# Patient Record
Sex: FEMALE | Race: WHITE | URBAN - METROPOLITAN AREA
[De-identification: names, ages, dates, MRNs, and addresses within clinical notes are randomized per-mention and may not be internally consistent; named-entity substitution may affect disease eponyms.]

---

## 2017-04-24 ENCOUNTER — TRANSFERRED RECORDS (OUTPATIENT)
Dept: HEALTH INFORMATION MANAGEMENT | Facility: CLINIC | Age: 22
End: 2017-04-24

## 2017-05-08 DIAGNOSIS — H47.10 PAPILLEDEMA: ICD-10-CM

## 2017-05-08 DIAGNOSIS — H53.10 SUBJECTIVE VISUAL DISTURBANCE: ICD-10-CM

## 2017-05-09 ENCOUNTER — TELEPHONE (OUTPATIENT)
Dept: OPHTHALMOLOGY | Facility: CLINIC | Age: 22
End: 2017-05-09

## 2017-05-09 NOTE — TELEPHONE ENCOUNTER
[facility]  Eye Care: [phone]  Stated they had no record of this patient; Was told there were two other clinics pt might have been seen at:    2nd clinic:  [phone]  Stated they had no record of this patient    3rd clinic ([facility])  [phone]  Stated they had no record of this patient

## 2017-05-19 ENCOUNTER — TELEPHONE (OUTPATIENT)
Dept: OPHTHALMOLOGY | Facility: CLINIC | Age: 22
End: 2017-05-19

## 2017-05-24 ENCOUNTER — OFFICE VISIT (OUTPATIENT)
Dept: OPHTHALMOLOGY | Facility: CLINIC | Age: 22
End: 2017-05-24
Attending: OPHTHALMOLOGY
Payer: COMMERCIAL

## 2017-05-24 DIAGNOSIS — H47.10 PAPILLEDEMA: Primary | ICD-10-CM

## 2017-05-24 DIAGNOSIS — H53.10 SUBJECTIVE VISUAL DISTURBANCE: ICD-10-CM

## 2017-05-24 PROCEDURE — 99215 OFFICE O/P EST HI 40 MIN: CPT | Mod: ZF

## 2017-05-24 PROCEDURE — 92083 EXTENDED VISUAL FIELD XM: CPT | Mod: ZF | Performed by: OPHTHALMOLOGY

## 2017-05-24 RX ORDER — FERROUS SULFATE 325(65) MG
325 TABLET, DELAYED RELEASE (ENTERIC COATED) ORAL
COMMUNITY
Start: 2016-11-11

## 2017-05-24 ASSESSMENT — VISUAL ACUITY
METHOD: SNELLEN - LINEAR
OD_CC: 20/20
CORRECTION_TYPE: GLASSES
OS_CC: 20/20

## 2017-05-24 ASSESSMENT — REFRACTION_WEARINGRX
OS_CYLINDER: +0.50
OS_AXIS: 101
OS_SPHERE: -2.50
OD_SPHERE: -2.00
OD_CYLINDER: SPHERE

## 2017-05-24 ASSESSMENT — CONF VISUAL FIELD
OD_NORMAL: 1
OS_NORMAL: 1

## 2017-05-24 ASSESSMENT — TONOMETRY
OD_IOP_MMHG: 8
OS_IOP_MMHG: 12
IOP_METHOD: TONOPEN

## 2017-05-24 ASSESSMENT — EXTERNAL EXAM - RIGHT EYE: OD_EXAM: NORMAL

## 2017-05-24 ASSESSMENT — EXTERNAL EXAM - LEFT EYE: OS_EXAM: NORMAL

## 2017-05-24 ASSESSMENT — SLIT LAMP EXAM - LIDS
COMMENTS: NORMAL
COMMENTS: NORMAL

## 2017-05-24 NOTE — LETTER
May 30, 2017    RE: Enid Peña  : 1995  MRN: [id number]    Dear Dr. Scott,    Thank you for referring your patient, Enid Peña, to my neuro-ophthalmology clinic recently.  After a thorough neuro-ophthalmic history and examination, I came to the following conclusions:       1. Bilateral papilledema with negative neuro-imaging (by report):    Enid Peña is 21 year old female referred from Dr Ludivina KURTZ in [place], [place], for incidentally noted papilledema at an annual visit one month ago. She has been symptomatic aside from mild headaches the past 2 months. She has not had vision changes, and she had a normal MRI by radiology report on . Her past medical history is notable for chronic iron-deficiency anemia, as well as a recent miscarriage on  with significant blood loss. She had previously gained ~25 pounds over the past year, but has been dieting the past month with ~15 pounds of weight loss over that time frame.    In clinic today, she has 20/20 acuity in both eyes, full color plates, and mild bilateral optic nerve swelling on dilated fundus exam. Her visual fields showed an enlarged blind spot in the right eye and mild nasal deficits in the left eye indicating that her vision is only mildly affected by her papilledema at this time.    In this setting by far the most likely diagnosis is pseudutumor cerebri given the patient's age, gender, body habitus, weight gain preceding symptoms, and lack of other neurological symptoms. I did recommend that she undergo a lumbar puncture in order to obtain an opening pressure and test cerebrospinal fluid constituents to rule-out an unlikely mimicker of pseudotumor cerebri. We discussed that, if she has pseudotumor as I suspect, her headaches and optic nerve swelling would either dramatically improve or resolve with 10-15% weight loss from her current weight. She and her  have a plan in place to achieve this.  I did not recommend treatment of her elevated pressure at this time with Diamox given her lack of significant symptoms, relatively mild papilledema, and minimal vision loss.    I will attempt to track down her MRI images to review.      Again, thank you for trusting me with the care of your patient.  For further exam details, please feel free to contact our office for additional records.  If you wish to contact me regarding this patient please email me at [email or web address] or give my clinic a call to arrange a phone conversation.    Sincerely,    Lev Nolasco MD  , Neuro-Ophthalmology and Adult Strabismus  Department of Ophthalmology and Visual Neurosciences  [facility]    DX: papilledema

## 2017-05-24 NOTE — NURSING NOTE
Chief Complaints and History of Present Illnesses   Patient presents with     Neurologic Problem     optic disc edema     HPI    Symptoms:           Do you have eye pain now?:  No      Comments:  Enid is a 21 year old female presenting with a history of:    1. Optic disc edema  Diagnosed 1 month ago at [facility] (Dr. Cole) and Dr. Ray  - no vision concerns  - no headaches  - no tinnitus  - no medications started.   - MRI done at [facility] within the past month. unremakable per patient account.   - blood work normal  Carmina GALVEZ 10:00 AM May 24, 2017

## 2017-05-24 NOTE — MR AVS SNAPSHOT
After Visit Summary   5/24/2017    Enid Peña    MRN: [id number]           Patient Information     Date Of Birth          1995        Visit Information        Provider Department      5/24/2017 10:00 AM Lev Nolasco MD [facility]        Today's Diagnoses     Papilledema    -  1    Subjective visual disturbance           Follow-ups after your visit        Your next 10 appointments already scheduled     May 31, 2017  2:15 PM CDT   XR LUMBAR PUNCTURE SPINAL TAP DIAGNOSTIC with [id number]   [facility] ([facility])    [address]  [facility] [address] [phone]           Follow your care team s guidelines for eating and drinking.  You may need to stop taking certain medicines before this exam. If so, we will tell you in advance.  Tell your care team:   If you have any allergies.   If there s a chance you may be pregnant.            Aug 18, 2017  9:30 AM CDT   RETURN NEURO with Lev Nolasco MD   Eye Clinic ([facility])    [facility] [address] [facility] [address] [place] [id number] [phone]              Future tests that were ordered for you today     Open Future Orders        Priority Expected Expires Ordered    Protein total CSF: Tube 4 Routine  8/22/2017 5/24/2017    XR Lumbar Puncture Spinal Tap Diag Routine 5/24/2017 5/24/2018 5/24/2017    Cell count with differential fluid Routine  8/22/2017 5/24/2017    Glucose CSF: Tube 2 Routine  8/22/2017 5/24/2017    CBC with platelets Routine  8/22/2017 5/24/2017            Who to contact     Please call your clinic at [phone] to:    Ask questions about your health    Make or cancel appointments    Discuss your medicines    Learn about your test results    Speak to your doctor   If you have compliments or concerns about an experience at your clinic, or if you wish to file a complaint, please contact  [facility] Patient Relations at [phone] or email us at [email or web address]         Additional Information About Your Visit        MyChart Information     [employer] is an electronic gateway that provides easy, online access to your medical records. With [employer], you can request a clinic appointment, read your test results, renew a prescription or communicate with your care team.     To sign up for [employer] visit the website at [email or web address]   You will be asked to enter the access code listed below, as well as some personal information. Please follow the directions to create your username and password.     Your access code is: [email or web address]  Expires: 2017  6:30 AM     Your access code will  in 90 days. If you need help or a new code, please contact your [facility] or call [phone] for assistance.        Care EveryWhere ID     This is your Care EveryWhere ID. This could be used by other organizations to access your [facility] medical records  [id number]         Blood Pressure from Last 3 Encounters:   No data found for BP    Weight from Last 3 Encounters:   No data found for Wt              We Performed the Following     Glaucoma Top OU     IOP Measurement        Primary Care Provider Office Phone # Fax #    Leobardo Scott [phone]       [place] [phone]        Thank you!     Thank you for choosing EYE CLINIC  for your care. Our goal is always to provide you with excellent care. Hearing back from our patients is one way we can continue to improve our services. Please take a few minutes to complete the written survey that you may receive in the mail after your visit with us. Thank you!             Your Updated Medication List - Protect others around you: Learn how to safely use, store and throw away your medicines at www.disposemymeds.org.          This  list is accurate as of: 5/24/17 11:20 AM.  Always use your most recent med list.                   Brand Name Dispense Instructions for use    ferrous sulfate 325 (65 FE) MG Tbec EC tablet      Take 325 mg by mouth

## 2017-05-24 NOTE — PROGRESS NOTES
ASSESSMENT AND PLAN:       1. Bilateral papilledema with negative neuro-imaging (by report):    Enid Peña is 21 year old female referred from Dr Ludivina KURTZ in [place], [place], for incidentally noted papilledema at an annual visit one month ago. She has been symptomatic aside from mild headaches the past 2 months. She has not had vision changes, and she had a normal MRI by radiology report on 4/24. Her past medical history is notable for chronic iron-deficiency anemia, as well as a recent miscarriage on 5/4 with significant blood loss. She had previously gained ~25 pounds over the past year, but has been dieting the past month with ~15 pounds of weight loss over that time frame.    In clinic today, she has 20/20 acuity in both eyes, full color plates, and mild bilateral optic nerve swelling on dilated fundus exam. Her visual fields showed an enlarged blind spot in the right eye and mild nasal deficits in the left eye indicating that her vision is only mildly affected by her papilledema at this time.    In this setting by far the most likely diagnosis is pseudutumor cerebri given the patient's age, gender, body habitus, weight gain preceding symptoms, and lack of other neurological symptoms. I did recommend that she undergo a lumbar puncture in order to obtain an opening pressure and test cerebrospinal fluid constituents to rule-out an unlikely mimicker of pseudotumor cerebri. We discussed that, if she has pseudotumor as I suspect, her headaches and optic nerve swelling would either dramatically improve or resolve with 10-15% weight loss from her current weight. She and her  have a plan in place to achieve this. I did not recommend treatment of her elevated pressure at this time with Diamox given her lack of significant symptoms, relatively mild papilledema, and minimal vision loss.    I will attempt to track down her MRI images to review.     I spent a total of 60 minutes face to face with  Enid Peña during today's office visit.  Over 50% of this time was spent counseling the patient and/or coordinating care regarding her papilledema.    Complete documentation of historical and exam elements from today's encounter can be found in the full encounter summary report (not reduplicated in this progress note).  I personally obtained the chief complaint(s) and history of present illness.  I confirmed and edited as necessary the review of systems, past medical/surgical history, family history, social history, and examination findings as documented by others; and I examined the patient myself.  I personally reviewed the relevant tests, images, and reports as documented above.  I formulated and edited as necessary the assessment and plan and discussed the findings and management plan with the patient and family     Lev Nolasco MD

## 2017-05-31 ENCOUNTER — HOSPITAL ENCOUNTER (OUTPATIENT)
Dept: GENERAL RADIOLOGY | Facility: CLINIC | Age: 22
Discharge: HOME | End: 2017-05-31
Attending: OPHTHALMOLOGY | Admitting: OPHTHALMOLOGY
Payer: COMMERCIAL

## 2017-05-31 VITALS — DIASTOLIC BLOOD PRESSURE: 88 MMHG | SYSTOLIC BLOOD PRESSURE: 147 MMHG | HEART RATE: 80 BPM

## 2017-05-31 DIAGNOSIS — H47.10 PAPILLEDEMA: ICD-10-CM

## 2017-05-31 DIAGNOSIS — H53.10 SUBJECTIVE VISUAL DISTURBANCE: ICD-10-CM

## 2017-05-31 LAB
APPEARANCE CSF: CLEAR
COLOR CSF: COLORLESS
ERYTHROCYTE [DISTWIDTH] IN BLOOD BY AUTOMATED COUNT: 17.9 % (ref 10–15)
GLUCOSE CSF-MCNC: 52 MG/DL (ref 40–70)
HCT VFR BLD AUTO: 30.7 % (ref 35–47)
HGB BLD-MCNC: 8.8 G/DL (ref 11.7–15.7)
MCH RBC QN AUTO: 19.3 PG (ref 26.5–33)
MCHC RBC AUTO-ENTMCNC: 28.7 G/DL (ref 31.5–36.5)
MCV RBC AUTO: 67 FL (ref 78–100)
PLATELET # BLD AUTO: 317 10E9/L (ref 150–450)
PROT CSF-MCNC: 21 MG/DL (ref 15–60)
RBC # BLD AUTO: 4.57 10E12/L (ref 3.8–5.2)
RBC # CSF MANUAL: 0 /UL (ref 0–2)
SPECIMEN VOL CSF: 11 ML
TUBE # CSF: 4 #
WBC # BLD AUTO: 7.2 10E9/L (ref 4–11)
WBC # CSF MANUAL: 1 /UL (ref 0–5)

## 2017-05-31 PROCEDURE — 84157 ASSAY OF PROTEIN OTHER: CPT | Performed by: OPHTHALMOLOGY

## 2017-05-31 PROCEDURE — 62270 DX LMBR SPI PNXR: CPT

## 2017-05-31 PROCEDURE — 25000125 ZZHC RX 250: Performed by: PHYSICIAN ASSISTANT

## 2017-05-31 PROCEDURE — 82945 GLUCOSE OTHER FLUID: CPT | Performed by: OPHTHALMOLOGY

## 2017-05-31 PROCEDURE — 85027 COMPLETE CBC AUTOMATED: CPT | Performed by: OPHTHALMOLOGY

## 2017-05-31 PROCEDURE — 89050 BODY FLUID CELL COUNT: CPT | Performed by: OPHTHALMOLOGY

## 2017-05-31 RX ORDER — LIDOCAINE HYDROCHLORIDE 10 MG/ML
5 INJECTION, SOLUTION EPIDURAL; INFILTRATION; INTRACAUDAL; PERINEURAL ONCE
Status: COMPLETED | OUTPATIENT
Start: 2017-05-31 | End: 2017-05-31

## 2017-05-31 RX ADMIN — LIDOCAINE HYDROCHLORIDE 50 MG: 10 INJECTION, SOLUTION INFILTRATION; PERINEURAL at 14:26

## 2017-05-31 NOTE — PROGRESS NOTES
RADIOLOGY PROCEDURE NOTE  Patient name: Enid Peña  MRN: [id number]  : 1995    Pre-procedure diagnosis: Headaches  Post-procedure diagnosis: Same    Procedure Date/Time: May 31, 2017  2:33 PM  Procedure: LP at L2-L3  Estimated blood loss: None  Specimen(s) collected with description: 11 mL of clear CSF  The patient tolerated the procedure well with no immediate complications.  Significant findings:Elevated opening pressure of 29 cm of H20    See imaging dictation for procedural details.    Provider name: Connor Shell  Assistant(s):None

## 2017-05-31 NOTE — PROGRESS NOTES
Pt was here today for a Lumbar Puncture under fluoroscopy guidance. Procedure performed by SARAH WHEAT . Procedure went well and was tolerated well and 8 Cc's of  Clear CSF was removed and sent to lab for testing. Pt recovered in radiology for 1 hour and remained stable. Pt was discharged in satisfactory condition and was driven home by her Aunt.

## 2017-06-01 ENCOUNTER — TELEPHONE (OUTPATIENT)
Dept: OPHTHALMOLOGY | Facility: CLINIC | Age: 22
End: 2017-06-01

## 2017-06-01 NOTE — TELEPHONE ENCOUNTER
Called patient to discuss her lumbar puncture and blood tests.  Lumbar puncture showed normal cerebrospinal fluid constituents but opening pressure was elevated at 31.  Complete blood count (CBC) showed moderate anemia with hgb 8.8/ hematocrit 30.7.  Patient has known anemia that she reported was due to iron deficiency / heavy menses.  Advised her that she MUST follow-up with her primary care physician and obgyn for further evaluation and treatment of anemia as this may be worsening her pseudotumor cerebri and of course has other health consequences.    She will follow-up with me as scheduled for her pseudotumor cerebri in around 3 months.

## 2017-08-16 DIAGNOSIS — H47.10 PAPILLEDEMA: Primary | ICD-10-CM
